# Patient Record
Sex: FEMALE | URBAN - METROPOLITAN AREA
[De-identification: names, ages, dates, MRNs, and addresses within clinical notes are randomized per-mention and may not be internally consistent; named-entity substitution may affect disease eponyms.]

---

## 2022-02-22 ENCOUNTER — OFFICE VISIT (OUTPATIENT)
Dept: URBAN - METROPOLITAN AREA CLINIC 33 | Facility: CLINIC | Age: 27
End: 2022-02-22
Payer: COMMERCIAL

## 2022-02-22 DIAGNOSIS — H52.223 REGULAR ASTIGMATISM, BILATERAL: ICD-10-CM

## 2022-02-22 DIAGNOSIS — H53.141 ASTHENOPIA OF RIGHT EYE: Primary | ICD-10-CM

## 2022-02-22 PROCEDURE — 92002 INTRM OPH EXAM NEW PATIENT: CPT | Performed by: OPTOMETRIST

## 2022-02-22 ASSESSMENT — VISUAL ACUITY
OS: 20/20
OD: 20/20

## 2022-02-22 ASSESSMENT — INTRAOCULAR PRESSURE
OD: 14
OS: 14

## 2022-02-22 NOTE — IMPRESSION/PLAN
Impression: Asthenopia of right eye: H53.141. Plan: Asthenopia OD, since 1 week. No signs of disc edema, pupil abnormality, or EOM dysfunction. Hyperopic astigmat. Rx'd glasses to minimize asthenopia.